# Patient Record
Sex: FEMALE | Race: WHITE | NOT HISPANIC OR LATINO | URBAN - METROPOLITAN AREA
[De-identification: names, ages, dates, MRNs, and addresses within clinical notes are randomized per-mention and may not be internally consistent; named-entity substitution may affect disease eponyms.]

---

## 2019-07-30 ENCOUNTER — FOLLOW UP ESTABLISHED (OUTPATIENT)
Dept: URBAN - METROPOLITAN AREA CLINIC 30 | Facility: CLINIC | Age: 74
End: 2019-07-30
Payer: MEDICARE

## 2019-07-30 DIAGNOSIS — H53.15 VISUAL DISTORTIONS OF SHAPE AND SIZE: Primary | ICD-10-CM

## 2019-07-30 DIAGNOSIS — H43.393 OTHER VITREOUS OPACITIES, BILATERAL: ICD-10-CM

## 2019-07-30 PROCEDURE — 92002 INTRM OPH EXAM NEW PATIENT: CPT | Performed by: OPTOMETRIST

## 2019-07-30 PROCEDURE — 92014 COMPRE OPH EXAM EST PT 1/>: CPT | Performed by: OPTOMETRIST

## 2019-07-30 ASSESSMENT — INTRAOCULAR PRESSURE
OS: 16
OD: 15

## 2019-09-30 ENCOUNTER — FOLLOW UP ESTABLISHED (OUTPATIENT)
Dept: URBAN - METROPOLITAN AREA CLINIC 30 | Facility: CLINIC | Age: 74
End: 2019-09-30
Payer: MEDICARE

## 2019-09-30 DIAGNOSIS — H04.123 DRY EYE SYNDROME OF BILATERAL LACRIMAL GLANDS: Primary | ICD-10-CM

## 2019-09-30 PROCEDURE — 99213 OFFICE O/P EST LOW 20 MIN: CPT | Performed by: OPTOMETRIST

## 2019-09-30 RX ORDER — POLYVINYL ALCOHOL 14 MG/ML
1.4 % SOLUTION/ DROPS OPHTHALMIC
Qty: 0 | Refills: 0 | Status: ACTIVE
Start: 2019-09-30

## 2019-09-30 ASSESSMENT — KERATOMETRY
OD: 44.84
OS: 229.94

## 2019-09-30 ASSESSMENT — VISUAL ACUITY
OD: 20/25
OS: 20/25

## 2019-09-30 ASSESSMENT — INTRAOCULAR PRESSURE
OS: 20
OD: 20

## 2021-10-26 ENCOUNTER — OFFICE VISIT (OUTPATIENT)
Dept: URBAN - METROPOLITAN AREA CLINIC 30 | Facility: CLINIC | Age: 76
End: 2021-10-26
Payer: MEDICARE

## 2021-10-26 DIAGNOSIS — H25.13 AGE-RELATED NUCLEAR CATARACT, BILATERAL: ICD-10-CM

## 2021-10-26 PROCEDURE — 92134 CPTRZ OPH DX IMG PST SGM RTA: CPT | Performed by: OPTOMETRIST

## 2021-10-26 PROCEDURE — 99213 OFFICE O/P EST LOW 20 MIN: CPT | Performed by: OPTOMETRIST

## 2021-10-26 ASSESSMENT — VISUAL ACUITY
OS: 20/25
OD: 20/25

## 2021-10-26 ASSESSMENT — INTRAOCULAR PRESSURE
OD: 20
OS: 18

## 2021-10-26 ASSESSMENT — KERATOMETRY
OD: 44.94
OS: 44.83

## 2021-10-26 NOTE — IMPRESSION/PLAN
Impression: Other vitreous opacities, bilateral Plan: All signs and risks of retinal detachment or tears were discussed in detail. If pt. notices any symptoms discussed, contact office ASAP. MAC OCT done today.

## 2021-10-26 NOTE — IMPRESSION/PLAN
Impression: Tear film insufficiency of bilateral lacrimal glands Plan: Continue AT's qid OU. O3's. Avoid ceiling fans.

## 2021-10-26 NOTE — IMPRESSION/PLAN
Impression: Visual distortions of shape and size Plan: Longstanding. Pt ed of condition. No other headache or sequelae. Self limiting. Cont to monitor.